# Patient Record
Sex: MALE | Race: WHITE | Employment: OTHER | URBAN - NONMETROPOLITAN AREA
[De-identification: names, ages, dates, MRNs, and addresses within clinical notes are randomized per-mention and may not be internally consistent; named-entity substitution may affect disease eponyms.]

---

## 2017-03-22 PROBLEM — Z95.828 S/P INSERTION OF ILIAC ARTERY STENT: Status: ACTIVE | Noted: 2017-03-22

## 2017-03-22 PROBLEM — I73.9 PAD (PERIPHERAL ARTERY DISEASE) (HCC): Status: ACTIVE | Noted: 2017-03-22

## 2017-03-22 PROBLEM — I73.9 CLAUDICATION OF GLUTEAL REGION (HCC): Status: ACTIVE | Noted: 2017-03-22

## 2020-06-16 ENCOUNTER — TELEPHONE (OUTPATIENT)
Dept: PULMONOLOGY | Age: 65
End: 2020-06-16

## 2020-07-04 NOTE — PROGRESS NOTES
[facility]  Pulmonary medicine clinic initial consult note. Patient: Geremias Foster  : 1955      Chief complaint/[place]:  Geremias Foster is a 59 y. o.oldmale came for further evaluation regarding his COPD and dyspnea with referral from Dr. Alexandra Quiros.    He used to follow with Dr. Roxy Rondon at [facility] in the past for his COPD. He switched his care to me for continuation of his COPD management. He was diagnosed with Lung cancer- ? Small cell in his right upper lobe in . He underwent right upper lobectomy by Angie Cervantes MD in . He used to follow with Dr.Joseph Tone MD at that time. He is currently on treatment with following inhalers/Nebs:  -Symbicort 160/4.5mcg spray MDI, 2 puffs via inhalation BID. -Spiriva Respimat 2 INH once daily in am.  - Albuterol HFA 90mcg/Spray MDI, 2puffs  Q6Hprn. He is having shortness of breath: Yes  Onset: gradual   Duration:Chronic  Diurnal variation:  None. Current functional capacity on level ground: 2 block/s on level ground. He can climb steps: Yes  Flights of steps he can climb: 1  He denies orthopnea. He denies paroxysmal nocturnal dyspnea. He is having cough: No    He is currently not using any oxygen supplementation at rest, exercise or during sleep/at night time. He was never diagnosed with pulmonary diseases I.e bronchial asthma,Pulmonary fibrosis, Sarcoidosis, pulmonary embolism,pulmonary hypertension or pleural effusion/s in the past.    He denies any hemoptysis. He was never diagnosed with pulmonary tuberculosis in the past. He denies any recent exposure to any patients with tuberculosis. He denies any recent travel to endemic places of tuberculosis.      He was never diagnosed with obstructive sleep apnea in the past.    Social History:  Occupation:  He is current working: Yes  Type of profession: He is currently working at [employer]. History of tobacco smoking:Yes  Amount of tobacco smokinPPD. Years of tobacco smokin. Quit smoking: Yes. Quit year: 2006- 14years back  Current smoker: No.         History of recreational or IV drug use in the past:NO     History of exposure to coal mines/coal dust: NO  History of exposure to foundry dust/welding: NO  History of exposure to quarry/silica/sandblasting: NO  History of exposure to asbestos/working with breaks/ships: NO  History of exposure to farm dust: NO  History of recent travel to long distances: NO  History of exposure to birds, pigeons, or chickens in the past:NO  Pet animals at home:No    History of pulmonary embolism in the past: No            History of DVT in the past:No                             Review of Systems:   General/Constitutional: He lost 15lbs of weight in the last 1year intentionally with normal appetite. No fever or chills. HENT: Negative. Eyes: Negative. Upper respiratory tract: No nasal stuffiness or post nasal drip. Lower respiratory tract/ lungs: See HPI. No hemoptysis. Cardiovascular: No palpitations or chest pain. Gastrointestinal: No nausea or vomiting. Neurological: No focal neurologiacal weakness. Extremities: No edema. Musculoskeletal: No complaints. Genitourinary: No complaints. Hematological: Negative. Psychiatric/Behavioral: Negative. Skin: No itching.       Current Medications:        Past Medical History:   Diagnosis Date    Blood transfusion     Cancer ([address]     COPD (chronic obstructive pulmonary disease) ([address]     EMPHYSEMA    Diabetes mellitus ([address]     Hyperlipidemia     Hypertension     Irregular heartbeat     Peripheral vascular disease ([address]     STENTS BILATERAL FEMORAL       Past Surgical History:   Procedure Laterality Date    [facility]TER      [facility]    LUNG CANCER SURGERY  2006    OTHER SURGICAL HISTORY  2012 mediport removal       Allergies   Allergen Reactions    Latex Hives and Rash     TAPE    Pcn [Penicillins] Swelling       Current Outpatient Medications   Medication Sig Dispense Refill    furosemide (LASIX) 20 MG tablet Take 20 mg by mouth 2 times daily      clopidogrel (PLAVIX) 75 MG tablet TAKE ONE TABLET BY MOUTH ONCE DAILY 30 tablet 11    metoprolol tartrate (LOPRESSOR) 25 MG tablet Take 100 mg by mouth 2 times daily       ranolazine (RANEXA) 500 MG extended release tablet Take 1,000 mg by mouth 2 times daily       glipiZIDE (GLUCOTROL) 5 MG tablet Take 10 mg by mouth daily       tiotropium (SPIRIVA HANDIHALER) 18 MCG inhalation capsule Inhale 18 mcg into the lungs daily      budesonide-formoterol (SYMBICORT) 160-4.5 MCG/ACT AERO Inhale 2 puffs into the lungs 2 times daily      albuterol (PROVENTIL;VENTOLIN) 90 MCG/ACT inhaler Inhale 2 puffs into the lungs every 6 hours as needed.  simvastatin (ZOCOR) 20 MG tablet Take by mouth nightly       pantoprazole (PROTONIX) 40 MG tablet Take 40 mg by mouth nightly.  aspirin 81 MG tablet Take 81 mg by mouth nightly. No current facility-administered medications for this visit. Family History   Problem Relation Age of Onset    Cancer Father     Heart Disease Father     Hearing Loss Father     Other Sister         FIBROMYALGIA           Physical Exam:     VITALS:  /80 (Site: Left Upper Arm, Position: Sitting, Cuff Size: Medium Adult)   Pulse 58   Temp 98.1 °F (36.7 °C) Comment: Forehead  Resp 16   Ht 5' 8\" (1.727 m)   Wt 194 lb 9.6 oz (88.3 kg)   SpO2 97% Comment: on RA  BMI 29.59 kg/m²   Nursing note and vitals reviewed. Constitutional: Patient appears moderately built and moderately nourished. No distress. Patient is oriented to person, place, and time. HENT:   Head: Normocephalic and atraumatic.    Right Ear: External ear normal.   Left Ear: External ear normal.   Mouth/Throat: Oropharynx is clear and moist.  No oral thrush. Eyes: Conjunctivae are normal. Pupils are equal, round, and reactive to light. No scleral icterus. Neck: Neck supple. No JVD present. No tracheal deviation present. Cardiovascular: Normal rate, regular rhythm, normal heart sounds. No murmur heard. Pulmonary/Chest: Effort normal and breath sounds normal. No stridor. No respiratory distress. No wheezes. No rales. Patient exhibits no tenderness. Abdominal: Soft. Patient exhibits no distension. No tenderness. Musculoskeletal: Normal range of motion. Extremities: Patient exhibits no edema and no tenderness. Lymphadenopathy:  No cervical adenopathy. Neurological: Patient is alert and oriented to person, place, and time. Skin: Skin is warm and dry. Patient is not diaphoretic. Psychiatric: Patient  has a normal mood and affect. Patient behavior is normal.       Diagnostic Data:    Radiological Data:    CT of chest with contrast:   R a d i o l o g y   R e p o r t       Accession Number:  Procedure Name:             Exam Date/Time:      [id number]      CT Thorax Lungs W Contr     7/3/2012 11:34:00 AM    REPORT     CT SCAN OF THE CHEST WITH INTRAVENOUS CONTRAST   No significant lymphadenopathy in the chest.  Few small mediastinal lymph      nodes measuring less than 1 cm in short axis diameter are again noted. IMPRESSION:   ESSENTIALLY STABLE EXAMINATION. NO EVIDENCE OF DISEASE RECURRENCE. NO      METASTATIC DISEASE IN THE CHEST. Pulmonary function tests: 07/07/20  *                Echocardiogram:  None in Epic      Six Minute Walk Test done on 7/30/20 at 11:52 AM EDT    Delgado Justice 1955    Six minute walk test done in my office today by medical assistant Troy Head  Oxygen saturation at rest on room air was (Percent): 93  Oxygen saturation on room air with exertion dropped to (Percent): 88      Time from beginning of the test to above desaturation: 2 minute 27 seconds.     The six minute walk test was repeated with oxygen supplementation. Oxygen supplimentation was started with 1LPM via nasal cannula and titrated to 1 LPM via nasal cannula. At the end of the test his oxygen saturation remained at 93 % on 1 LPM with exertion. He is mobile in the home and requires oxygen as outlined above. He needs no home O2 at rest. He needs 1 LPM of home O2 with exercise. He will be evaluated for nocturnal home O2 requirement- see separte order. Please see scanned six minute walk test document in media for details. DME Medical Necessity Documentation    Patient was seen in my clinic on 7/30/20 for the diagnosis COPD. I am prescribing oxygen because the diagnosis and testing requires the patient to have oxygen in the home. Condition will improve or be benefited by oxygen use. The patient is  able to perform good mobility in a home setting and therefore does require the use of a portable oxygen system. Assessment:  -Severe COPD- under control with current inhalers therapy. -Exertional dyspnea due to COPD Vs other etiologies  -Hx of Lung cancer- ? Small cell in his right upper lobe in 2006. He underwent right upper lobectomy by Gilberto Cervantes MD in 2006. He used to follow with Dr.Joseph Tone MD at that time.  -Hypertension.  -Peripheral vascular disease ([address] S/p Stents in place in both femoral arteries.  -Diabetes mellitus ([address]    Recommendations/Plan:  -Please obtain latest Echocardiogram report from MD Cecy office for review.  -Continue Symbicort 160/4.5mcg spray MDI, 2 puffs via inhalation BID. -Continue Spiriva Respimat 2 INH once daily in am.  -Continue Albuterol HFA 90mcg/Spray MDI, 2puffs  Q6Hprn.  -Patient advised to continue current inhalers and keep good compliance. Patient verbalizes understanding.  - Patient educated to update his pneumococcal vaccine with family physician and take influenza vaccine in coming season with out fail.  Patient verbalizes understanding.  - Schedule patient for Pulmonary rehab consult as soon as possible for pulmonary rehab therapy for his COPD once cleared by his Cardiologist.  -aNtalie Hui needs no home O2 at rest. He needs 1LPM of home O2 with exercise. He will be evaluated for nocturnal home O2 requirement- see separte order.  -Nocturnal pulse ox study on room air to check for the continuation/discontinuation of patient current home O2 at night time. - Schedule patient for low dose CT scan of chest with out IV contrast as recommended by the  U.S. Preventive Services Task Force and the NCCN for lung Cancer Screening 1 week before clinic visit. - Schedule patient for follow up with my clinic in 1 to 2 weeks with recommended test I.e Low dose CT chest. Patient advised to make early appointment if needed. - Patient educated about my impression and plan. Patient verbalizes understanding. Low Dose CT (LDCT) Lung Screening criteria met   Age 50-69   Pack year smoking >30   Still smoking or less than 15 year since quit   No sign or symptoms of lung cancer   > 11 months since last LDCT     Risks and benefits of lung cancer screening with LDCT scans discussed:    Significance of positive screen - False-positive LDCT results often occur. 95% of all positive results do not lead to a diagnosis of cancer. Usually further imaging can resolve most false-positive results; however, some patients may require invasive procedures. Over diagnosis risk - 10% to 12% of screen-detected lung cancer cases are over diagnosed--that is, the cancer would not have been detected in the patient's lifetime without the screening. Need for follow up screens annually to continue lung cancer screening effectiveness     Risks associated with radiation from annual LDCT- Radiation exposure is about the same as for a mammogram, which is about 1/3 of the annual background radiation exposure from everyday life.   Starting screening at age 54 is not likely to increase cancer risk from radiation

## 2020-07-30 ENCOUNTER — OFFICE VISIT (OUTPATIENT)
Dept: PULMONOLOGY | Age: 65
End: 2020-07-30
Payer: COMMERCIAL

## 2020-07-30 VITALS
WEIGHT: 194.6 LBS | RESPIRATION RATE: 16 BRPM | HEIGHT: 68 IN | DIASTOLIC BLOOD PRESSURE: 80 MMHG | HEART RATE: 58 BPM | SYSTOLIC BLOOD PRESSURE: 136 MMHG | OXYGEN SATURATION: 97 % | TEMPERATURE: 98.1 F | BODY MASS INDEX: 29.49 KG/M2

## 2020-07-30 PROCEDURE — G0296 VISIT TO DETERM LDCT ELIG: HCPCS | Performed by: INTERNAL MEDICINE

## 2020-07-30 PROCEDURE — 99205 OFFICE O/P NEW HI 60 MIN: CPT | Performed by: INTERNAL MEDICINE

## 2020-07-30 PROCEDURE — 94618 PULMONARY STRESS TESTING: CPT | Performed by: INTERNAL MEDICINE

## 2020-07-30 RX ORDER — FUROSEMIDE 20 MG/1
20 TABLET ORAL 2 TIMES DAILY
COMMUNITY

## 2020-07-30 NOTE — PROGRESS NOTES
Neck Circumference -   17 in  Mallampati - IV    Lung Nodule Screening     [x] Qualifies    [x] Does not qualify   [] Declined    [] Completed

## 2020-08-05 ENCOUNTER — TELEPHONE (OUTPATIENT)
Dept: PULMONOLOGY | Age: 65
End: 2020-08-05

## 2020-08-05 NOTE — TELEPHONE ENCOUNTER
Patient's wife Tisha Sample called back and message was given. She was advised to have patient call the office back if he has any further questions.

## 2020-08-05 NOTE — TELEPHONE ENCOUNTER
----- Message from Dahlia Han MD sent at 8/4/2020  4:22 PM EDT -----  He needs no home O2 at rest. He needs 1LPM of home O2 with exercise. He needs 2LPM via nasal cannula at night time.

## 2020-08-13 ENCOUNTER — HOSPITAL ENCOUNTER (OUTPATIENT)
Dept: CT IMAGING | Age: 65
Discharge: HOME | End: 2020-08-13

## 2020-08-20 ENCOUNTER — OFFICE VISIT (OUTPATIENT)
Dept: PULMONOLOGY | Age: 65
End: 2020-08-20
Payer: COMMERCIAL

## 2020-08-20 VITALS
HEIGHT: 68 IN | BODY MASS INDEX: 29.95 KG/M2 | SYSTOLIC BLOOD PRESSURE: 126 MMHG | OXYGEN SATURATION: 98 % | WEIGHT: 197.6 LBS | DIASTOLIC BLOOD PRESSURE: 80 MMHG | HEART RATE: 55 BPM | TEMPERATURE: 97.6 F

## 2020-08-20 PROCEDURE — G0296 VISIT TO DETERM LDCT ELIG: HCPCS | Performed by: INTERNAL MEDICINE

## 2020-08-20 PROCEDURE — 99214 OFFICE O/P EST MOD 30 MIN: CPT | Performed by: INTERNAL MEDICINE

## 2020-08-20 RX ORDER — SITAGLIPTIN 100 MG/1
TABLET, FILM COATED ORAL
COMMUNITY
Start: 2020-08-04

## 2020-08-20 NOTE — PROGRESS NOTES
tuberculosis. He was never diagnosed with obstructive sleep apnea in the past.    Social History:  Occupation:  He is current working: Yes  Type of profession: He is currently working at [employer]. History of tobacco smoking:Yes  Amount of tobacco smokinPPD. Years of tobacco smokin. Quit smoking: Yes. Quit year: - 14years back  Current smoker: No.         History of recreational or IV drug use in the past:NO     History of exposure to coal mines/coal dust: NO  History of exposure to foundry dust/welding: NO  History of exposure to quarry/silica/sandblasting: NO  History of exposure to asbestos/working with breaks/ships: NO  History of exposure to farm dust: NO  History of recent travel to long distances: NO  History of exposure to birds, pigeons, or chickens in the past:NO  Pet animals at home:No    History of pulmonary embolism in the past: No            History of DVT in the past:No                             Review of Systems:   General/Constitutional: Please see HPI section regarding weight, appetite, fever and chills. HENT: Negative. Eyes: Negative. Upper respiratory tract: No nasal stuffiness or post nasal drip. Lower respiratory tract/ lungs: Please see HPI section. Cardiovascular: No palpitations or chest pain. Gastrointestinal: No nausea or vomiting. Neurological: No focal neurologiacal weakness. Extremities: No edema. Musculoskeletal: No complaints. Genitourinary: No complaints. Hematological: Negative. Psychiatric/Behavioral: Negative. Skin: No itching.       Current Medications:        Past Medical History:   Diagnosis Date    Blood transfusion     Cancer [facility]     COPD (chronic obstructive pulmonary disease) ([facility]     EMPHYSEMA    Diabetes mellitus ([facility]     Hyperlipidemia     Hypertension     Irregular heartbeat     Peripheral vascular disease ([facility])     STENTS BILATERAL FEMORAL       Past (Percent): 93  Oxygen saturation on room air with exertion dropped to (Percent): 88      Time from beginning of the test to above desaturation: 2 minute 27 seconds. The six minute walk test was repeated with oxygen supplementation. Oxygen supplimentation was started with 1LPM via nasal cannula and titrated to 1 LPM via nasal cannula. At the end of the test his oxygen saturation remained at 93 % on 1 LPM with exertion. He is mobile in the home and requires oxygen as outlined above. He needs no home O2 at rest. He needs 1 LPM of home O2 with exercise. He will be evaluated for nocturnal home O2 requirement- see separte order. Please see scanned six minute walk test document in media for details. DME Medical Necessity Documentation    Patient was seen in my clinic on 7/30/20 for the diagnosis COPD. I am prescribing oxygen because the diagnosis and testing requires the patient to have oxygen in the home. Condition will improve or be benefited by oxygen use. The patient is  able to perform good mobility in a home setting and therefore does require the use of a portable oxygen system. Echocardiogram: 1/2/2020      Low dose CT chest: 08/13/2020        Nocturnal Pulse ox study:              Assessment:  -Severe COPD- under control with current inhalers therapy. -Exertional dyspnea due to COPD- Improved  -Hx of Lung cancer- ? Small cell in his right upper lobe in 2006. He underwent right upper lobectomy by Humberto Cervantes MD in 2006. He used to follow with Dr.Joseph Tone MD at that time.  -Hypertension under control  -Peripheral vascular disease ([address] S/p Stents in place in both femoral arteries.  -Diabetes mellitus ([address]    Recommendations/Plan:  -Continue Symbicort 160/4.5mcg spray MDI, 2 puffs via inhalation BID.    -Continue Spiriva Respimat 2 INH once daily in am.  -Continue Albuterol HFA 90mcg/Spray MDI, 2puffs  Q6Hprn.  -Patient advised to continue current inhalers and keep good compliance. Patient verbalizes understanding.  - Patient educated to update his pneumococcal vaccine with family physician and take influenza vaccine in coming season with out fail. Patient verbalizes understanding.  -Continue pulmonary rehab therapy for his COPD. He is waiting to start next month. Ramonita Asencio needs no home O2 at rest. He needs 1LPM of home O2 with exercise and 2LPM at night time.  -Schedule patient for low dose CT scan of chest with out IV contrast as recommended by the  U.S. Preventive Services Task Force and the NCCN for lung Cancer Screening 1 year from now.  -Schedule patient for full pulmonary function tests before clinic visit. - Schedule patient for follow up with my clinic in 1year with recommended test I.e Low dose CT chest and full PFTS before clinic visit. Patient advised to make early appointment if needed. - Patient and his wife were educated about my impression and plan. They verbalizes understanding. Low Dose CT (LDCT) Lung Screening criteria met   Age 50-69   Pack year smoking >30   Still smoking or less than 15 year since quit   No sign or symptoms of lung cancer   > 11 months since last LDCT     Risks and benefits of lung cancer screening with LDCT scans discussed:    Significance of positive screen - False-positive LDCT results often occur. 95% of all positive results do not lead to a diagnosis of cancer. Usually further imaging can resolve most false-positive results; however, some patients may require invasive procedures. Over diagnosis risk - 10% to 12% of screen-detected lung cancer cases are over diagnosed--that is, the cancer would not have been detected in the patient's lifetime without the screening.     Need for follow up screens annually to continue lung cancer screening effectiveness     Risks associated with radiation from annual LDCT- Radiation exposure is about the same as for a mammogram, which is about 1/3 of the annual background radiation exposure from everyday life. Starting screening at age 54 is not likely to increase cancer risk from radiation exposure. Patients with comorbidities resulting in life expectancy of < 10 years, or that would preclude treatment of an abnormality identified on CT, should not be screened due to lack of benefit.     To obtain maximal benefit from this screening, smoking cessation and long-term abstinence from smoking is critical

## 2020-08-20 NOTE — PROGRESS NOTES
Neck Circumference -   18 inches  Mallampati - III    Lung Nodule Screening     [x] Qualifies    [] Does not qualify   [] Declined    [x] Completed 8/13/20

## 2020-08-20 NOTE — PATIENT INSTRUCTIONS
Recommendations/Plan:  -Continue Symbicort 160/4.5mcg spray MDI, 2 puffs via inhalation BID. -Continue Spiriva Respimat 2 INH once daily in am.  -Continue Albuterol HFA 90mcg/Spray MDI, 2puffs  Q6Hprn.  -Patient advised to continue current inhalers and keep good compliance. Patient verbalizes understanding.  - Patient educated to update his pneumococcal vaccine with family physician and take influenza vaccine in coming season with out fail. Patient verbalizes understanding.  -Continue pulmonary rehab therapy for his COPD. He is waiting to start next month. Gsiel Beltran needs no home O2 at rest. He needs 1LPM of home O2 with exercise and 2LPM at night time.  -Schedule patient for low dose CT scan of chest with out IV contrast as recommended by the  U.S. Preventive Services Task Force and the NCCN for lung Cancer Screening 1 year from now.  -Schedule patient for full pulmonary function tests before clinic visit. - Schedule patient for follow up with my clinic in 1year with recommended test I.e Low dose CT chest and full PFTS before clinic visit. Patient advised to make early appointment if needed. - Patient and his wife were educated about my impression and plan. They verbalizes understanding. What is lung cancer screening? Lung cancer screening is a way in which doctors check the lungs for early signs of cancer in people who have no symptoms of lung cancer. A low-dose CT scan uses much less radiation than a normal CT scan and shows a more detailed image of the lungs than a standard X-ray. The goal of lung cancer screening is to find cancer early, before it has a chance to grow, spread, or cause problems. One large study found that smokers who were screened with low-dose CT scans were less likely to die of lung cancer than those who were screened with standard X-ray. Below is a summary of the things you need to know regarding screening for lung cancer with low-dose computed tomography (LDCT).   This is a screening program that involves routine annual screening with LDCT studies of the lung. The LDCTs are done using low-dose radiation that is not thought to increase your cancer risk. If you have other serious medical conditions (other cancers, congestive heart failure) that limit your life expectancy to less than 10 years, you should not undergo lung cancer screening with LDCT. The chance is 20%-60% that the LDCT result will show abnormalities. This would require additional testing which could include repeat imaging or even invasive procedures. Most (about 95%) of \"abnormal\" LDCT results are false in the sense that no lung cancer is ultimately found. Additionally, some (about 10%) of the cancers found would not affect your life expectancy, even if undetected and untreated. If you are still smoking, the single most important thing that you can do to reduce your risk of dying of lung cancer is to quit. For this screening to be covered by Medicare and most other insurers, strict criteria must be met. If you do not meet these criteria, but still wish to undergo LDCT testing, you will be required to sign a waiver indicating your willingness to pay for the scan.

## 2020-11-03 PROBLEM — I73.9 PAD (PERIPHERAL ARTERY DISEASE) (HCC): Status: RESOLVED | Noted: 2017-03-22 | Resolved: 2020-11-03

## 2021-08-24 ENCOUNTER — TELEPHONE (OUTPATIENT)
Dept: PULMONOLOGY | Age: 66
End: 2021-08-24

## 2021-08-24 NOTE — TELEPHONE ENCOUNTER
Patient didn't have his testing done and the order has . Can you please place new orders for pft and ct lung screen.

## 2021-09-07 DIAGNOSIS — Z87.891 PERSONAL HISTORY OF TOBACCO USE: ICD-10-CM

## 2021-09-17 DIAGNOSIS — Z87.891 PERSONAL HISTORY OF TOBACCO USE, PRESENTING HAZARDS TO HEALTH: ICD-10-CM

## 2021-09-17 DIAGNOSIS — J44.9 STAGE 3 SEVERE COPD BY GOLD CLASSIFICATION (HCC): ICD-10-CM

## 2021-10-14 NOTE — PROGRESS NOTES
[facility]  Pulmonary medicine clinic follow up note. Patient: Marlene Saucedo  : 1955     Lung Nodule Screening     []? Qualifies    []? Does not qualify   []? Declined    []? Completed ct chest 21     Chief complaint/[place]:  Marlene Saucedo is a 77 y. o.oldmale came for follow up regarding his COPD after having a low dose CT chest and Spirometry before clinic visit. On today's questioning:  He admits to cough with no expectoration. He denies hemoptysis. He denies fever or chills. He denies recent hospitalizations or emergency room visits. He is using his prescribed inhalers with excellent compliance. He is using his rescue inhaler rarely. He admits to recent loss of weight or appetite changes. He denies recent decline in functional status. He is waiting to start his pulmonary rehab on 09/10/2020. He was initially referred from Dr. Jeb Gooden.    He used to follow with Dr. Wilder Daniel at [facility] in the past for his COPD. He switched his care to me for continuation of his COPD management. He was diagnosed with Lung cancer- ? Small cell in his right upper lobe in . He underwent right upper lobectomy by Magdalena Cervantes MD in . He used to follow with Dr.Joseph Tone MD at that time. He is currently on treatment with following inhalers/Nebs:  -Symbicort 160/4.5mcg spray MDI, 2 puffs via inhalation BID. -Spiriva Respimat 2 INH once daily in am.  - Albuterol HFA 90mcg/Spray MDI, 2puffs  Q6Hprn. He is currently not using any home Oxygen at rest. He is using 1LPM of home O2 with exercise and 2LPM at night time. He was never diagnosed with pulmonary diseases I.e bronchial asthma,Pulmonary fibrosis, Sarcoidosis, pulmonary embolism,pulmonary hypertension or pleural effusion/s in the past.    He denies any hemoptysis.  He was never diagnosed with pulmonary tuberculosis in the past. He denies any recent exposure to any patients with tuberculosis. He denies any recent travel to endemic places of tuberculosis. He was never diagnosed with obstructive sleep apnea in the past.    Social History:  Occupation:  He is current working: Yes  Type of profession: He is currently working at [employer]. History of tobacco smoking:Yes  Amount of tobacco smokinPPD. Years of tobacco smokin. Quit smoking: Yes. Quit year: - 15years back  Current smoker: No.         History of recreational or IV drug use in the past:NO     History of exposure to coal mines/coal dust: NO  History of exposure to foundry dust/welding: NO  History of exposure to quarry/silica/sandblasting: NO  History of exposure to asbestos/working with breaks/ships: NO  History of exposure to farm dust: NO  History of recent travel to long distances: NO  History of exposure to birds, pigeons, or chickens in the past:NO  Pet animals at home:No    History of pulmonary embolism in the past: No            History of DVT in the past:No                             Review of Systems:   General/Constitutional: he lost 6 lbs of weight from the last visit. No fever or chills. HENT: Negative. Eyes: Negative. Upper respiratory tract: No nasal stuffiness or post nasal drip. Lower respiratory tract/ lungs: See HPI  Cardiovascular: No palpitations or chest pain. Gastrointestinal: No nausea or vomiting. Neurological: No focal neurologiacal weakness. Extremities: No edema. Musculoskeletal: No complaints. Genitourinary: No complaints. Hematological: Negative. Psychiatric/Behavioral: Negative. Skin: No itching.       Current Medications:      Past Medical History:   Diagnosis Date    Blood transfusion     [facility]     COPD [facility]chronic obstructive pulmonary disease) [facility]     EMPHYSEMA    Diabetes mellitus [facility]     Hyperlipidemia     Hypertension  Irregular heartbeat     Peripheral vascular disease ([address]     STENTS BILATERAL FEMORAL       Past Surgical History:   Procedure Laterality Date    [facility]    LUNG CANCER SURGERY  2006    OTHER SURGICAL HISTORY  11/6/2012    mediport removal       Allergies   Allergen Reactions    Latex Hives and Rash     TAPE    Pcn [Penicillins] Swelling       Current Outpatient Medications   Medication Sig Dispense Refill    apixaban (ELIQUIS) 5 MG TABS tablet Take by mouth 2 times daily      JANUVIA 100 MG tablet       furosemide (LASIX) 20 MG tablet Take 20 mg by mouth 2 times daily      glipiZIDE (GLUCOTROL) 5 MG tablet Take 10 mg by mouth daily       tiotropium (SPIRIVA HANDIHALER) 18 MCG inhalation capsule Inhale 18 mcg into the lungs daily      albuterol (PROVENTIL;VENTOLIN) 90 MCG/ACT inhaler Inhale 2 puffs into the lungs every 6 hours as needed.  simvastatin (ZOCOR) 20 MG tablet Take 40 mg by mouth nightly       pantoprazole (PROTONIX) 40 MG tablet Take 40 mg by mouth nightly.  aspirin 81 MG tablet Take 81 mg by mouth nightly. No current facility-administered medications for this visit. Family History   Problem Relation Age of Onset    Cancer Father     Heart Disease Father     Hearing Loss Father     Other Sister         FIBROMYALGIA           Physical Exam:     VITALS:  /74 (Site: Left Upper Arm, Position: Sitting, Cuff Size: Medium Adult)   Pulse 77   Temp 98 °F (36.7 °C)   Ht 5' 8\" (1.727 m)   Wt 191 lb 6.4 oz (86.8 kg)   SpO2 98% Comment: room air at rest  BMI 29.10 kg/m²   Nursing note and vitals reviewed. Constitutional: Patient appears moderately built and moderately nourished. No distress. Patient is oriented to person, place, and time. HENT:   Head: Normocephalic and atraumatic. Right Ear: External ear normal.   Left Ear: External ear normal.   Mouth/Throat: Oropharynx is clear and moist.  No oral thrush.   Eyes: Conjunctivae are normal. Pupils are equal, round, and reactive to light. No scleral icterus. Neck: Neck supple. No JVD present. No tracheal deviation present. Cardiovascular: Normal rate, regular rhythm, normal heart sounds. No murmur heard. Pulmonary/Chest: Effort normal and breath sounds normal. No stridor. No respiratory distress. Bilateral expiratory wheezes. No rales. Patient exhibits no tenderness. Abdominal: Soft. Patient exhibits no distension. No tenderness. Musculoskeletal: Normal range of motion. Extremities: Patient exhibits no edema and no tenderness. Lymphadenopathy:  No cervical adenopathy. Neurological: Patient is alert and oriented to person, place, and time. Skin: Skin is warm and dry. Patient is not diaphoretic. Psychiatric: Patient  has a normal mood and affect. Patient behavior is normal.     Neck Circumference -   17  Mallampati - 4    Diagnostic Data:    Radiological Data:    CT of chest with contrast:   R a d i o l o g y   R e p o r t       Accession Number:  Procedure Name:             Exam Date/Time:      [id number]      CT Thorax Lungs W Contr     7/3/2012 11:34:00 AM    REPORT     CT SCAN OF THE CHEST WITH INTRAVENOUS CONTRAST   No significant lymphadenopathy in the chest.  Few small mediastinal lymph      nodes measuring less than 1 cm in short axis diameter are again noted. IMPRESSION:   ESSENTIALLY STABLE EXAMINATION. NO EVIDENCE OF DISEASE RECURRENCE. NO      METASTATIC DISEASE IN THE CHEST. Pulmonary function tests: 07/07/20  *          Six Minute Walk Test done on 7/30/20 at 11:52 AM EDT    Chastitymakenzie Harshad 1955    Six minute walk test done in my office today by medical assistant Miss: Bonnie Page  Oxygen saturation at rest on room air was (Percent): 93  Oxygen saturation on room air with exertion dropped to (Percent): 88      Time from beginning of the test to above desaturation: 2 minute 27 seconds.     The six minute walk test was repeated with oxygen supplementation. Oxygen supplimentation was started with 1LPM via nasal cannula and titrated to 1 LPM via nasal cannula. At the end of the test his oxygen saturation remained at 93 % on 1 LPM with exertion. He is mobile in the home and requires oxygen as outlined above. He needs no home O2 at rest. He needs 1 LPM of home O2 with exercise. He will be evaluated for nocturnal home O2 requirement- see separte order. Please see scanned six minute walk test document in media for details. DME Medical Necessity Documentation    Patient was seen in my clinic on 7/30/20 for the diagnosis COPD. I am prescribing oxygen because the diagnosis and testing requires the patient to have oxygen in the home. Condition will improve or be benefited by oxygen use. The patient is  able to perform good mobility in a home setting and therefore does require the use of a portable oxygen system. Echocardiogram: 1/2/2020      Low dose CT chest: 08/13/2020        Nocturnal Pulse ox study: Performed in 2020          Pulmonary function test performed on 9 September 2021:          His FEV1 improved from previous spirometry done in the past.      Low dose CT Chest with out contrast for Lung Nodule Screening: Performed on seventh of September 2021          The above radiological study films were reviewed by me. Assessment:  -Severe COPD- Not under control with current inhalers therapy. He is having ongoing exacerbation.  -Exertional dyspnea due to COPD- Improved   -Hx of Lung cancer- ? Small cell in his right upper lobe in 2006. He underwent right upper lobectomy by Wilmar Cervantes MD in 2006. He used to follow with Dr.Joseph Tone MD at that time.  -Hypertension under control  -Peripheral vascular disease ([address] S/p Stents in place in both femoral arteries. -?  Coronary artery disease.   He is currently waiting for cardiac catheterization by Dr. Mu Rodriguez MD.   -Diabetes mellitus [facility])  -History of tobacco smoking in the past.  He quit smoking 19 June 2006. Recommendations/Plan:  -Prednisone 40mg po daily for 5days with No refills. He was detailed about mechanism of action of drug along with associated side effects. He agreed to take the risk and medication. He verbalizes understanding. He was advised to take prednisone after food.  -Continue Symbicort 160/4.5mcg spray MDI, 2 puffs via inhalation BID. Refills given for 1 year  -Continue Spiriva Respimat 2 INH once daily in am.  Refills given for 1 year  -Continue Albuterol HFA 90mcg/Spray MDI, 2puffs  Q6Hprn. Refills given for 1 year  -Patient advised to continue current inhalers and keep good compliance. Patient verbalizes understanding.  - Patient educated to update his pneumococcal vaccine with family physician and take influenza vaccine in coming season with out fail. Patient verbalizes understanding.  -He completed pulmonary rehab therapy at  [facility]  -Tamika Mackey needs no home O2 at rest. He needs 1LPM of home O2 with exercise and 2LPM at night time.  -Schedule patient for full pulmonary function tests before clinic visit. - Schedule patient for follow up with my clinic in 6 months for clinical reevaluation. Patient advised to make early appointment if needed. - Patient was educated about my impression and plan. He verbalizes understanding.    -I personally reviewed and updated the Past medical hx, Past surgical hx,Social hx, Family hx, Medications, Allergies in the discrete data section of the patient chart. I also reviewed pertaining labs and Pulmonary medicine,Sleep medicine related, Pathological, Microbiological and Radiological investigations.

## 2021-11-08 ENCOUNTER — TELEPHONE (OUTPATIENT)
Dept: PULMONOLOGY | Age: 66
End: 2021-11-08

## 2021-11-08 NOTE — TELEPHONE ENCOUNTER
[facility] called stating on patient rx for CTLD where is has Occurrences Remaining this needs to be changed to a 1 instead of a 0. Please advise.

## 2021-11-09 ENCOUNTER — HOSPITAL ENCOUNTER (OUTPATIENT)
Dept: CT IMAGING | Age: 66
Discharge: HOME | End: 2021-11-09
Payer: COMMERCIAL

## 2021-11-09 DIAGNOSIS — Z87.891 PERSONAL HISTORY OF TOBACCO USE, PRESENTING HAZARDS TO HEALTH: ICD-10-CM

## 2021-11-09 DIAGNOSIS — Z87.891 PERSONAL HISTORY OF TOBACCO USE: ICD-10-CM

## 2021-11-09 DIAGNOSIS — J44.9 STAGE 3 SEVERE COPD BY GOLD CLASSIFICATION (HCC): ICD-10-CM

## 2021-11-09 PROCEDURE — 71271 CT THORAX LUNG CANCER SCR C-: CPT

## 2021-11-11 ENCOUNTER — OFFICE VISIT (OUTPATIENT)
Dept: PULMONOLOGY | Age: 66
End: 2021-11-11
Payer: COMMERCIAL

## 2021-11-11 VITALS
OXYGEN SATURATION: 98 % | WEIGHT: 191.4 LBS | HEART RATE: 77 BPM | TEMPERATURE: 98 F | SYSTOLIC BLOOD PRESSURE: 118 MMHG | DIASTOLIC BLOOD PRESSURE: 74 MMHG | BODY MASS INDEX: 29.01 KG/M2 | HEIGHT: 68 IN

## 2021-11-11 DIAGNOSIS — J44.9 STAGE 3 SEVERE COPD BY GOLD CLASSIFICATION (HCC): ICD-10-CM

## 2021-11-11 DIAGNOSIS — J44.1 COPD EXACERBATION (HCC): Primary | ICD-10-CM

## 2021-11-11 PROCEDURE — 99214 OFFICE O/P EST MOD 30 MIN: CPT | Performed by: INTERNAL MEDICINE

## 2021-11-11 RX ORDER — ALBUTEROL SULFATE 90 UG/1
2 AEROSOL, METERED RESPIRATORY (INHALATION) EVERY 6 HOURS PRN
Qty: 1 EACH | Refills: 11 | Status: SHIPPED | OUTPATIENT
Start: 2021-11-11 | End: 2022-11-11

## 2021-11-11 RX ORDER — PREDNISONE 10 MG/1
40 TABLET ORAL DAILY
Qty: 20 TABLET | Refills: 0 | Status: SHIPPED | OUTPATIENT
Start: 2021-11-11 | End: 2021-11-16

## 2021-11-11 RX ORDER — BUDESONIDE AND FORMOTEROL FUMARATE DIHYDRATE 160; 4.5 UG/1; UG/1
2 AEROSOL RESPIRATORY (INHALATION) 2 TIMES DAILY
Qty: 1 EACH | Refills: 11 | Status: SHIPPED | OUTPATIENT
Start: 2021-11-11

## 2021-11-11 NOTE — PATIENT INSTRUCTIONS
Recommendations/Plan:  -Prednisone 40mg po daily for 5days with No refills. He was detailed about mechanism of action of drug along with associated side effects. He agreed to take the risk and medication. He verbalizes understanding. He was advised to take prednisone after food.  -Continue Symbicort 160/4.5mcg spray MDI, 2 puffs via inhalation BID. Refills given for 1 year  -Continue Spiriva Respimat 2 INH once daily in am.  Refills given for 1 year  -Continue Albuterol HFA 90mcg/Spray MDI, 2puffs  Q6Hprn. Refills given for 1 year  -Patient advised to continue current inhalers and keep good compliance. Patient verbalizes understanding.  - Patient educated to update his pneumococcal vaccine with family physician and take influenza vaccine in coming season with out fail. Patient verbalizes understanding.  -He completed pulmonary rehab therapy at  [facility]  -Yazmin Arthur needs no home O2 at rest. He needs 1LPM of home O2 with exercise and 2LPM at night time.  -Schedule patient for full pulmonary function tests before clinic visit. - Schedule patient for follow up with my clinic in 6 months for clinical reevaluation. Patient advised to make early appointment if needed. - Patient was educated about my impression and plan. He verbalizes understanding.

## 2021-11-11 NOTE — PROGRESS NOTES
Neck Circumference -   17  Mallampati - 4    Lung Nodule Screening     [] Qualifies    [] Does not qualify   [] Declined    [] Completed ct chest 11/9/21

## 2022-04-17 NOTE — PROGRESS NOTES
[facility]  Pulmonary medicine clinic follow up note. Patient: Lillian Ontiveros  : 1955     Lung Nodule Screening     []? Qualifies    []? Does not qualify   []? Declined    []? Completed ct chest 21     Chief complaint/[place]:  Lillian Ontiveros is a 77 y. o.oldmale came for follow up regarding his severe COPD. He had an episode of worsening of shortness of breath cough and sputum production. He visited emergency room at [facility].  Patient underwent a chest x-ray PA and lateral views found to have no significant changes. Patient was subsequently given oral steroids and antibiotic therapy. Patient had a another episode of worsening of shortness of breath. He was evaluated at emergency room at [facility].  Patient currently taking second round of oral prednisone and antibiotic. He was advised to use a 1 L of oxygen with exercise and 2 L at nighttime. Patient came to my clinic without any oxygen supplementation. On further questioning he told me that he is using oxygen as needed during daytime. On today's questioning:  He admits to cough with light yellow expectoration. He denies hemoptysis. He denies fever or chills. He admits to recent hospitalizations or emergency room visits. He is using his prescribed inhalers with excellent compliance. He is using his rescue inhaler frequently    He admits to recent loss of weight I.e 8 pounds from his last visit with a normal appetite. He denies any recent decline in functional status. He is waiting to start his pulmonary rehab on 09/10/2020. He was initially referred from Dr. Silvia Grace.    He used to follow with Dr. Shana Mi at [facility] in the past for his COPD. He switched his care to me for continuation of his COPD management. He was diagnosed with Lung cancer- ? Small cell in his right upper lobe in .  He underwent right upper lobectomy by Richard Cervantes MD in . He used to follow with Dr.Joseph Tone MD at that time. He is currently on treatment with following inhalers/Nebs:  -Symbicort 160/4.5mcg spray MDI, 2 puffs via inhalation BID. -Spiriva Respimat 2 INH once daily in am.  - Albuterol HFA 90mcg/Spray MDI, 2puffs  Q6Hprn. He is currently not using any home Oxygen at rest. He is using 1LPM of home O2 with exercise and 2LPM at night time. He was never diagnosed with pulmonary diseases I.e bronchial asthma,Pulmonary fibrosis, Sarcoidosis, pulmonary embolism,pulmonary hypertension or pleural effusion/s in the past.    He denies any hemoptysis. He was never diagnosed with pulmonary tuberculosis in the past. He denies any recent exposure to any patients with tuberculosis. He denies any recent travel to endemic places of tuberculosis. He was never diagnosed with obstructive sleep apnea in the past.    Social History:  Occupation:  He is current working: Yes  Type of profession: He is currently working at [employer]. History of tobacco smoking:Yes  Amount of tobacco smokinPPD. Years of tobacco smokin. Quit smoking: Yes.               Quit year: - 15years back  Current smoker: No.         History of recreational or IV drug use in the past:NO     History of exposure to coal mines/coal dust: NO  History of exposure to foundry dust/welding: NO  History of exposure to quarry/silica/sandblasting: NO  History of exposure to asbestos/working with breaks/ships: NO  History of exposure to farm dust: NO  History of recent travel to long distances: NO  History of exposure to birds, pigeons, or chickens in the past:NO  Pet animals at home:No    History of pulmonary embolism in the past: No            History of DVT in the past:No                             Review of Systems:   General/Constitutional: he lost 8 lbs of weight from the last visit. No fever or chills. HENT: Negative. Eyes: Negative. Upper respiratory tract: No nasal stuffiness or post nasal drip. Lower respiratory tract/ lungs: See HPI  Cardiovascular: No palpitations or chest pain. Gastrointestinal: No nausea or vomiting. Neurological: No focal neurologiacal weakness. Extremities: No edema. Musculoskeletal: No complaints. Genitourinary: No complaints. Hematological: Negative. Psychiatric/Behavioral: Negative. Skin: No itching. Current Medications:      Past Medical History:   Diagnosis Date    Blood transfusion     [facility]     COPD (chronic obstructive pulmonary disease) ([facility]     EMPHYSEMA    Diabetes mellitus ([facility]     Hyperlipidemia     Hypertension     Irregular heartbeat     [facility]     STENTS BILATERAL FEMORAL       Past Surgical History:   Procedure Laterality Date    [facility]    LUNG CANCER SURGERY  2006    [facility]       Allergies   Allergen Reactions    Latex Hives and Rash     TAPE    Pcn [Penicillins] Swelling       Current Outpatient Medications   Medication Sig Dispense Refill    apixaban (ELIQUIS) 5 MG TABS tablet Take by mouth 2 times daily      budesonide-formoterol (SYMBICORT) 160-4.5 MCG/ACT AERO Inhale 2 puffs into the lungs 2 times daily 1 each 11    tiotropium (SPIRIVA RESPIMAT) 2.5 MCG/ACT AERS inhaler Inhale 2 puffs into the lungs daily 1 each 11    albuterol sulfate HFA (PROAIR HFA) 108 (90 Base) MCG/ACT inhaler Inhale 2 puffs into the lungs every 6 hours as needed for Wheezing or Shortness of Breath 1 each 11    JANUVIA 100 MG tablet       furosemide (LASIX) 20 MG tablet Take 20 mg by mouth 2 times daily      glipiZIDE (GLUCOTROL) 5 MG tablet Take 10 mg by mouth daily       simvastatin (ZOCOR) 20 MG tablet Take 40 mg by mouth nightly       pantoprazole (PROTONIX) 40 MG tablet Take 40 mg by mouth nightly.  aspirin 81 MG tablet Take 81 mg by mouth nightly. No current facility-administered medications for this visit. Family History   Problem Relation Age of Onset    Cancer Father     Heart Disease Father     Hearing Loss Father     Other Sister         FIBROMYALGIA           Physical Exam:     VITALS:  /78 (Site: Left Upper Arm, Position: Sitting, Cuff Size: Medium Adult)   Pulse 82   Temp 97.8 °F (36.6 °C)   Ht 5' 8\" (1.727 m)   Wt 183 lb (83 kg)   SpO2 94% Comment: room air at rest  BMI 27.83 kg/m²   Nursing note and vitals reviewed. Constitutional: Patient appears moderately built and moderately nourished. No distress. Patient is oriented to person, place, and time. HENT:   Head: Normocephalic and atraumatic. Right Ear: External ear normal.   Left Ear: External ear normal.   Mouth/Throat: Oropharynx is clear and moist.  No oral thrush. Eyes: Conjunctivae are normal. Pupils are equal, round, and reactive to light. No scleral icterus. Neck: Neck supple. No JVD present. No tracheal deviation present. Cardiovascular: Normal rate, regular rhythm, normal heart sounds. No murmur heard. Pulmonary/Chest: Effort normal and breath sounds normal. No stridor. No respiratory distress. No wheezes. No rales. Patient exhibits no tenderness. Abdominal: Soft. Patient exhibits no distension. No tenderness. Musculoskeletal: Normal range of motion. Extremities: Patient exhibits no edema and no tenderness. Lymphadenopathy:  No cervical adenopathy. Neurological: Patient is alert and oriented to person, place, and time. Skin: Skin is warm and dry. Patient is not diaphoretic. Psychiatric: Patient  has a normal mood and affect.  Patient behavior is normal.       Neck Circumference -   17  Mallampati - 4    Diagnostic Data:    Radiological Data:    CT of chest with contrast:   R a d i o l o g y   R e p o r t       Accession Number:  Procedure Name:             Exam Date/Time: [id number]      CT Thorax Lungs W Contr     7/3/2012 11:34:00 AM    REPORT     CT SCAN OF THE CHEST WITH INTRAVENOUS CONTRAST   No significant lymphadenopathy in the chest.  Few small mediastinal lymph      nodes measuring less than 1 cm in short axis diameter are again noted. IMPRESSION:   ESSENTIALLY STABLE EXAMINATION. NO EVIDENCE OF DISEASE RECURRENCE. NO      METASTATIC DISEASE IN THE CHEST. Pulmonary function tests: 07/07/20  *          Six Minute Walk Test done on 7/30/20 at 11:52 AM EDT    Stanley Ahr 1955    Six minute walk test done in my office today by medical assistant Miss: Janna Cristobal  Oxygen saturation at rest on room air was (Percent): 93  Oxygen saturation on room air with exertion dropped to (Percent): 88      Time from beginning of the test to above desaturation: 2 minute 27 seconds. The six minute walk test was repeated with oxygen supplementation. Oxygen supplimentation was started with 1LPM via nasal cannula and titrated to 1 LPM via nasal cannula. At the end of the test his oxygen saturation remained at 93 % on 1 LPM with exertion. He is mobile in the home and requires oxygen as outlined above. He needs no home O2 at rest. He needs 1 LPM of home O2 with exercise. He will be evaluated for nocturnal home O2 requirement- see separte order. Please see scanned six minute walk test document in media for details. DME Medical Necessity Documentation    Patient was seen in my clinic on 7/30/20 for the diagnosis COPD. I am prescribing oxygen because the diagnosis and testing requires the patient to have oxygen in the home. Condition will improve or be benefited by oxygen use. The patient is  able to perform good mobility in a home setting and therefore does require the use of a portable oxygen system.     Echocardiogram: 1/2/2020      Low dose CT chest: 08/13/2020        Nocturnal Pulse ox study: Performed in 2020          Pulmonary function test performed on 9 September 2021:          His FEV1 improved from previous spirometry done in the past.      Low dose CT Chest with out contrast for Lung Nodule Screening: Performed on seventh of September 2021          The above radiological study films were reviewed by me. Chest x-ray performed on 26 April 2022 at [facility]:        The above radiological study films were reviewed by me. Assessment:  -Severe COPD- Not under control with current inhalers therapy. He had a COPD exacerbation in April 2022 requiring emergency room visit. .  -Exertional dyspnea due to COPD- Improving   -Hx of Lung cancer- ? Small cell in his right upper lobe in 2006. He underwent right upper lobectomy by Rekha Cervantes MD in 2006. He used to follow with Dr.Joseph Tone MD at that time.  -Hypertension under control  -Peripheral vascular disease ([facility] S/p Stents in place in both femoral arteries.  -Coronary artery disease-he underwent cardiac catheterization by Dr. Sherly Jaimes MD-no stents placed per patient. There is only 50% blockage. .   -Diabetes mellitus ([facility]  -History of tobacco smoking in the past.  He quit smoking 19 June 2006. Recommendations/Plan:  -He was advised to continue taking oral prednisone and antibiotic given to him during his recent emergency room visit at [place] [address] 160/4.5mcg spray MDI, 2 puffs via inhalation BID. -Continue Spiriva Respimat 2 INH once daily in am.    -Continue Albuterol HFA 90mcg/Spray MDI, 2puffs  Q6Hprn.   -He was instructed to use Albuterol HFA  inhaler 2 puffs Q6h prn or Albuterol 2.5mg nebs Q6h prn (the nebulizer) at one time as rescue medication not both at the same time. He verbalizes understanding.   -Patient requires aerosol treatments at home to help maintain a stable respiratory status to help minimize emergency room visits and hospital admissions.  -Start patient on Daliresp 500mcg 1 tab po daily.  Patient educated in detail about the current indications of Daliresp along with adverse effects. Patient agreed to take the risk and verbalizes understanding.  -Patient advised to continue current inhalers and keep good compliance. Patient verbalizes understanding.  - Patient educated to update his pneumococcal vaccine with family physician and take influenza vaccine in coming season with out fail. Patient verbalizes understanding.  -He completed pulmonary rehab therapy at  [facility]  -Nura Bo needs no home O2 at rest. He needs 1LPM of home O2 with exercise and 2LPM at night time. He was advised to use her home oxygen with good compliance especially with exercise. - Schedule patient for follow up with my clinic in 6 months for clinical reevaluation with full PFTs before clinic visit. Patient advised to make early appointment if needed. - Patient was educated about my impression and plan. He verbalizes understanding.      -I personally reviewed and updated the Past medical hx, Past surgical hx,Social hx, Family hx, Medications, Allergies in the discrete data section of the patient chart. I also reviewed pertaining labs and Pulmonary medicine,Sleep medicine related, Pathological, Microbiological and Radiological investigations.

## 2022-05-12 ENCOUNTER — OFFICE VISIT (OUTPATIENT)
Dept: PULMONOLOGY | Age: 67
End: 2022-05-12
Payer: COMMERCIAL

## 2022-05-12 VITALS
WEIGHT: 183 LBS | OXYGEN SATURATION: 94 % | DIASTOLIC BLOOD PRESSURE: 78 MMHG | TEMPERATURE: 97.8 F | SYSTOLIC BLOOD PRESSURE: 132 MMHG | HEART RATE: 82 BPM | BODY MASS INDEX: 27.74 KG/M2 | HEIGHT: 68 IN

## 2022-05-12 DIAGNOSIS — J44.1 COPD EXACERBATION (HCC): ICD-10-CM

## 2022-05-12 DIAGNOSIS — Z72.0 TOBACCO ABUSE: ICD-10-CM

## 2022-05-12 DIAGNOSIS — J44.9 STAGE 3 SEVERE COPD BY GOLD CLASSIFICATION (HCC): Primary | ICD-10-CM

## 2022-05-12 PROCEDURE — 4040F PNEUMOC VAC/ADMIN/RCVD: CPT | Performed by: INTERNAL MEDICINE

## 2022-05-12 PROCEDURE — 99214 OFFICE O/P EST MOD 30 MIN: CPT | Performed by: INTERNAL MEDICINE

## 2022-05-12 PROCEDURE — 1123F ACP DISCUSS/DSCN MKR DOCD: CPT | Performed by: INTERNAL MEDICINE

## 2022-05-12 PROCEDURE — 3023F SPIROM DOC REV: CPT | Performed by: INTERNAL MEDICINE

## 2022-05-12 PROCEDURE — 3017F COLORECTAL CA SCREEN DOC REV: CPT | Performed by: INTERNAL MEDICINE

## 2022-05-12 PROCEDURE — G8427 DOCREV CUR MEDS BY ELIG CLIN: HCPCS | Performed by: INTERNAL MEDICINE

## 2022-05-12 PROCEDURE — G8417 CALC BMI ABV UP PARAM F/U: HCPCS | Performed by: INTERNAL MEDICINE

## 2022-05-12 PROCEDURE — 1036F TOBACCO NON-USER: CPT | Performed by: INTERNAL MEDICINE

## 2022-05-12 NOTE — PROGRESS NOTES
Neck Circumference -   17  Mallampati - 4    Lung Nodule Screening     [] Qualifies    [] Does not qualify   [] Declined    [] Completed

## 2022-05-12 NOTE — PATIENT INSTRUCTIONS
Recommendations/Plan:  -He was advised to continue taking oral prednisone and antibiotic given to him during his recent emergency room visit at [place] [address] 160/4.5mcg spray MDI, 2 puffs via inhalation BID. -Continue Spiriva Respimat 2 INH once daily in am.    -Continue Albuterol HFA 90mcg/Spray MDI, 2puffs  Q6Hprn.   -He was instructed to use Albuterol HFA  inhaler 2 puffs Q6h prn or Albuterol 2.5mg nebs Q6h prn (the nebulizer) at one time as rescue medication not both at the same time. He verbalizes understanding.   -Patient requires aerosol treatments at home to help maintain a stable respiratory status to help minimize emergency room visits and hospital admissions.  -Start patient on Daliresp 500mcg 1 tab po daily. Patient educated in detail about the current indications of Daliresp along with adverse effects. Patient agreed to take the risk and verbalizes understanding.  -Patient advised to continue current inhalers and keep good compliance. Patient verbalizes understanding.  - Patient educated to update his pneumococcal vaccine with family physician and take influenza vaccine in coming season with out fail. Patient verbalizes understanding.  -He completed pulmonary rehab therapy at  [facility]  [facility] Cynthia needs no home O2 at rest. He needs 1LPM of home O2 with exercise and 2LPM at night time. He was advised to use her home oxygen with good compliance especially with exercise. - Schedule patient for follow up with my clinic in 6 months for clinical reevaluation with full PFTs before clinic visit. Patient advised to make early appointment if needed. - Patient was educated about my impression and plan. He verbalizes understanding.

## 2022-05-24 ENCOUNTER — TELEPHONE (OUTPATIENT)
Dept: PULMONOLOGY | Age: 67
End: 2022-05-24

## 2022-05-24 NOTE — TELEPHONE ENCOUNTER
Patients wife called saying that Laurie Perla is having an allergic reaction to Daliresp including itching, rash from elbows to knuckles, diarrhea. She called pcp and they advised him to take benadryl. Patient tried taking half tablet but still could not tolerate. Please advise, thanks!

## 2022-05-24 NOTE — TELEPHONE ENCOUNTER
I called patient at his given phone number. According to patient's wife, patient received 1 tablet of Benadryl and currently sleeping. I spoke with the patient's wife and advised her to stop taking Daliresp and monitor the patient closely. If patient condition or shortness of breath get worse, she was advised to bring the patient to the emergency room for further evaluation. She verbalized understanding of her plan.

## 2022-10-19 NOTE — PROGRESS NOTES
[facility]  Pulmonary medicine clinic follow up note. Patient: Reta Monge  : 1955     Lung Nodule Screening     [] Qualifies    [] Does not qualify   [] Declined    [] Completed ct chest 21     Chief complaint/[place]:  Reta Monge is a 79 y. o.oldmale came for follow up regarding his severe COPD after having recommended full PFTs before clinic visit. He was started on Daliresp 500mcg 1 tab po daily at his last visit in May 2022. Patient developed Itching, rash on elbows and knuckles along with diarrhea after start taking Daliresp. Patient subsequently quit taking Daliresp. His symptoms of itchiness and diarrhea got better. Patient also took Benadryl. He was advised to use a 1 L of oxygen with exercise and 2 L at nighttime. Patient came to my clinic without any oxygen supplementation. On further questioning he told me that he is using oxygen as needed during daytime. On today's questioning:  He denies cough or expectoration. He denies hemoptysis. He denies fever or chills. He denies recent hospitalizations or emergency room visits. He is using his prescribed inhalers with good compliance. He is using rescue inhaler/nebs rarely. He denies recent loss of weight or appetite changes. He denies recent decline in functional status. He completed his pulmonary rehab therapy. He was initially referred from Dr. Ofelia Augustin.    He used to follow with Dr. Tony Hirsch at [facility] in the past for his COPD. He switched his care to me for continuation of his COPD management. He was diagnosed with Lung cancer- ? Small cell in his right upper lobe in . He underwent right upper lobectomy by Vedia Lanes Parvathaneni,MD in . He used to follow with Dr.Joseph Tone MD at that time.     He is currently on treatment with following inhalers/Nebs:  -Symbicort 160/4.5mcg spray MDI, 2 puffs via inhalation BID. -Spiriva Respimat 2 INH once daily in am.  - Albuterol HFA 90mcg/Spray MDI, 2puffs  Q6Hprn. He was started on Daliresp 500mcg 1 tab po daily at his last visit in May 2022. Patient developed Itching, rash on elbows and knuckles along with diarrhea after start taking Daliresp. Patient subsequently quit taking Daliresp. His symptoms of itchiness and diarrhea got better. Patient also took Benadryl. He was never diagnosed with pulmonary diseases I.e bronchial asthma,Pulmonary fibrosis, Sarcoidosis, pulmonary embolism,pulmonary hypertension or pleural effusion/s in the past.    He denies any hemoptysis. He was never diagnosed with pulmonary tuberculosis in the past. He denies any recent exposure to any patients with tuberculosis. He denies any recent travel to endemic places of tuberculosis. He was never diagnosed with obstructive sleep apnea in the past.    Social History:  Occupation:  He is current working: Yes  Type of profession: He is currently working at [employer]. History of tobacco smoking:Yes  Amount of tobacco smokinPPD. Years of tobacco smokin. Quit smoking: Yes. Quit year: - 15years back  Current smoker: No.         History of recreational or IV drug use in the past:NO     History of exposure to coal mines/coal dust: NO  History of exposure to foundry dust/welding: NO  History of exposure to quarry/silica/sandblasting: NO  History of exposure to asbestos/working with breaks/ships: NO  History of exposure to farm dust: NO  History of recent travel to long distances: NO  History of exposure to birds, pigeons, or chickens in the past:NO  Pet animals at home:No    History of pulmonary embolism in the past: No            History of DVT in the past:No                             Review of Systems:   General/Constitutional: he gained 4lbs of weight from the last visit.  No fever or chills. HENT: Negative. Eyes: Negative. Upper respiratory tract: No nasal stuffiness or post nasal drip. Lower respiratory tract/ lungs: No cough or sputum production. Cardiovascular: No palpitations or chest pain. Gastrointestinal: No nausea or vomiting. Neurological: No focal neurologiacal weakness. Extremities: No edema. Musculoskeletal: No complaints. Genitourinary: No complaints. Hematological: Negative. Psychiatric/Behavioral: Negative. Skin: No itching.         Current Medications:      Past Medical History:   Diagnosis Date    Blood transfusion     Cancer ([facility] [address]     COPD (chronic obstructive pulmonary disease) ([facility]     EMPHYSEMA    Diabetes mellitus ([facility]     Hyperlipidemia     Hypertension     Irregular heartbeat     Peripheral vascular disease ([facility]     STENTS BILATERAL FEMORAL       Past Surgical History:   Procedure Laterality Date    CENTRAL VENOUS CATHETER  2006    [facility]    LUNG CANCER SURGERY  2006    OTHER SURGICAL HISTORY  11/6/2012    [facility] removal       Allergies   Allergen Reactions    Latex Hives and Rash     TAPE    Pcn [Penicillins] Swelling       Current Outpatient Medications   Medication Sig Dispense Refill    metoprolol tartrate (LOPRESSOR) 25 MG tablet Take 25 mg by mouth 2 times daily      apixaban (ELIQUIS) 5 MG TABS tablet Take by mouth 2 times daily      budesonide-formoterol (SYMBICORT) 160-4.5 MCG/ACT AERO Inhale 2 puffs into the lungs 2 times daily 1 each 11    tiotropium (SPIRIVA RESPIMAT) 2.5 MCG/ACT AERS inhaler Inhale 2 puffs into the lungs daily 1 each 11    albuterol sulfate HFA (PROAIR HFA) 108 (90 Base) MCG/ACT inhaler Inhale 2 puffs into the lungs every 6 hours as needed for Wheezing or Shortness of Breath 1 each 11    JANUVIA 100 MG tablet       furosemide (LASIX) 20 MG tablet Take 20 mg by mouth 2 times daily      glipiZIDE (GLUCOTROL) 5 MG tablet Take 10 mg by mouth daily       simvastatin (ZOCOR) 20 MG tablet Take 40 mg by mouth nightly ZAIRA Cedeño 1955    Six minute walk test done in my office today by medical assistant Miss: Alaina Jose  Oxygen saturation at rest on room air was (Percent): 93  Oxygen saturation on room air with exertion dropped to (Percent): 88      Time from beginning of the test to above desaturation: 2 minute 27 seconds. The six minute walk test was repeated with oxygen supplementation. Oxygen supplimentation was started with 1LPM via nasal cannula and titrated to 1 LPM via nasal cannula. At the end of the test his oxygen saturation remained at 93 % on 1 LPM with exertion. He is mobile in the home and requires oxygen as outlined above. He needs no home O2 at rest. He needs 1 LPM of home O2 with exercise. He will be evaluated for nocturnal home O2 requirement- see separte order. Please see scanned six minute walk test document in media for details. DME Medical Necessity Documentation    Patient was seen in my clinic on 7/30/20 for the diagnosis COPD. I am prescribing oxygen because the diagnosis and testing requires the patient to have oxygen in the home. Condition will improve or be benefited by oxygen use. The patient is  able to perform good mobility in a home setting and therefore does require the use of a portable oxygen system. Echocardiogram: 1/2/2020      Nocturnal Pulse ox study: Performed in 2020          Pulmonary function test performed on 9 September 2021:              Low dose CT Chest with out contrast for Lung Nodule Screening: Performed on seventh of September 2021          The above radiological study films were reviewed by me. Chest x-ray performed on 26 April 2022 at [facility]:        The above radiological study films were reviewed by me. Full PFTS: Performed on 10 November 2022                Assessment:  -Severe COPD- under control with current inhalers therapy. He had a COPD exacerbation in April 2022 requiring emergency room visit. Nuno Hidden   He was started on Daliresp 1 tablet p.o. daily in the last visit. The Daliresp was discontinued due to development of allergic reaction to Daliresp therapy  -Exertional dyspnea due to COPD- Improving   -Hx of Lung cancer- ? Small cell in his right upper lobe in 2006. He underwent right upper lobectomy by Roxy Cervantes MD in 2006. He used to follow with Dr.Joseph Tone MD at that time.  -Hypertension under control  -Peripheral vascular disease ([facility] S/p Stents in place in both femoral arteries.  -Coronary artery disease-he underwent cardiac catheterization by Dr. Theron Holstein, MD-no stents placed per patient. There is only 50% blockage. .   -Diabetes mellitus ([facility]  -History of tobacco smoking in the past.  He quit smoking 19 June 2006. Recommendations/Plan:  -Continue Symbicort 160/4.5mcg spray MDI, 2 puffs via inhalation BID. Refills given  -Continue Spiriva Respimat 2 INH once daily in am.  Refills given  -Continue Albuterol HFA 90mcg/Spray MDI, 2puffs  Q6Hprn. Refills given. -He was instructed to use Albuterol HFA  inhaler 2 puffs Q6h prn or Albuterol 2.5mg nebs Q6h prn (the nebulizer) at one time as rescue medication not both at the same time. He verbalizes understanding.   -Patient advised to continue current inhalers and keep good compliance. Patient verbalizes understanding.  -Patient educated to update his pneumococcal vaccine with family physician and take influenza vaccine in coming season with out fail. Patient verbalizes understanding.  -Racheal Bowman needs no home O2 at rest. He needs 1LPM of home O2 with exercise and 2LPM at night time. He was advised to use her home oxygen with good compliance especially with exercise.  -Patient to have chest X-ray PA and Lateral views in 1year to follow abnormal chest X-ray with a history of for lung cancer and a right upper lobectomy. Chest Xray need to be done 2days before clinic visit.   - Schedule patient for follow up with my clinic in 12months for clinical reevaluation with a chest x-ray PA and lateral views and spirometry with bronchodilator response before clinic visit. Patient advised to make early appointment if needed.  -Patient was educated about my impression and plan. He verbalizes understanding.      -I personally reviewed and updated the Past medical hx, Past surgical hx,Social hx, Family hx, Medications, Allergies in the discrete data section of the patient chart. I also reviewed pertaining labs and Pulmonary medicine,Sleep medicine related, Pathological, Microbiological and Radiological investigations.

## 2022-11-16 DIAGNOSIS — J44.1 COPD EXACERBATION (HCC): ICD-10-CM

## 2022-11-16 DIAGNOSIS — J44.9 STAGE 3 SEVERE COPD BY GOLD CLASSIFICATION (HCC): ICD-10-CM

## 2022-11-16 DIAGNOSIS — Z72.0 TOBACCO ABUSE: ICD-10-CM

## 2022-11-17 ENCOUNTER — OFFICE VISIT (OUTPATIENT)
Dept: PULMONOLOGY | Age: 67
End: 2022-11-17
Payer: MEDICARE

## 2022-11-17 VITALS
BODY MASS INDEX: 28.34 KG/M2 | TEMPERATURE: 98.5 F | HEIGHT: 68 IN | DIASTOLIC BLOOD PRESSURE: 78 MMHG | SYSTOLIC BLOOD PRESSURE: 122 MMHG | OXYGEN SATURATION: 94 % | HEART RATE: 94 BPM | WEIGHT: 187 LBS

## 2022-11-17 DIAGNOSIS — Z87.891 PERSONAL HISTORY OF TOBACCO USE, PRESENTING HAZARDS TO HEALTH: ICD-10-CM

## 2022-11-17 DIAGNOSIS — J44.9 STAGE 3 SEVERE COPD BY GOLD CLASSIFICATION (HCC): Primary | ICD-10-CM

## 2022-11-17 DIAGNOSIS — R93.89 ABNORMAL CHEST X-RAY: ICD-10-CM

## 2022-11-17 DIAGNOSIS — J44.1 COPD EXACERBATION (HCC): ICD-10-CM

## 2022-11-17 PROCEDURE — G8484 FLU IMMUNIZE NO ADMIN: HCPCS | Performed by: INTERNAL MEDICINE

## 2022-11-17 PROCEDURE — 3017F COLORECTAL CA SCREEN DOC REV: CPT | Performed by: INTERNAL MEDICINE

## 2022-11-17 PROCEDURE — 3023F SPIROM DOC REV: CPT | Performed by: INTERNAL MEDICINE

## 2022-11-17 PROCEDURE — G8417 CALC BMI ABV UP PARAM F/U: HCPCS | Performed by: INTERNAL MEDICINE

## 2022-11-17 PROCEDURE — 1036F TOBACCO NON-USER: CPT | Performed by: INTERNAL MEDICINE

## 2022-11-17 PROCEDURE — G8427 DOCREV CUR MEDS BY ELIG CLIN: HCPCS | Performed by: INTERNAL MEDICINE

## 2022-11-17 PROCEDURE — 1123F ACP DISCUSS/DSCN MKR DOCD: CPT | Performed by: INTERNAL MEDICINE

## 2022-11-17 PROCEDURE — 99214 OFFICE O/P EST MOD 30 MIN: CPT | Performed by: INTERNAL MEDICINE

## 2022-11-17 RX ORDER — ALBUTEROL SULFATE 90 UG/1
2 AEROSOL, METERED RESPIRATORY (INHALATION) EVERY 6 HOURS PRN
Qty: 1 EACH | Refills: 10 | Status: SHIPPED | OUTPATIENT
Start: 2022-11-17 | End: 2023-11-17

## 2022-11-17 RX ORDER — BUDESONIDE AND FORMOTEROL FUMARATE DIHYDRATE 160; 4.5 UG/1; UG/1
2 AEROSOL RESPIRATORY (INHALATION) 2 TIMES DAILY
Qty: 1 EACH | Refills: 11 | Status: SHIPPED | OUTPATIENT
Start: 2022-11-17

## 2022-11-17 NOTE — PATIENT INSTRUCTIONS
Recommendations/Plan:  -Continue Symbicort 160/4.5mcg spray MDI, 2 puffs via inhalation BID. Refills given  -Continue Spiriva Respimat 2 INH once daily in am.  Refills given  -Continue Albuterol HFA 90mcg/Spray MDI, 2puffs  Q6Hprn. Refills given. -He was instructed to use Albuterol HFA  inhaler 2 puffs Q6h prn or Albuterol 2.5mg nebs Q6h prn (the nebulizer) at one time as rescue medication not both at the same time. He verbalizes understanding.   -Patient advised to continue current inhalers and keep good compliance. Patient verbalizes understanding.  -Patient educated to update his pneumococcal vaccine with family physician and take influenza vaccine in coming season with out fail. Patient verbalizes understanding.  -Burgess Ordoñez needs no home O2 at rest. He needs 1LPM of home O2 with exercise and 2LPM at night time. He was advised to use her home oxygen with good compliance especially with exercise.  -Patient to have chest X-ray PA and Lateral views in 1year to follow abnormal chest X-ray with a history of for lung cancer and a right upper lobectomy. Chest Xray need to be done 2days before clinic visit. - Schedule patient for follow up with my clinic in 12months for clinical reevaluation with a chest x-ray PA and lateral views and spirometry with bronchodilator response before clinic visit. Patient advised to make early appointment if needed.  -Patient was educated about my impression and plan. He verbalizes understanding.

## 2023-03-08 ENCOUNTER — HOSPITAL ENCOUNTER (OUTPATIENT)
Dept: GENERAL RADIOLOGY | Age: 68
Discharge: HOME | End: 2023-03-08

## 2023-03-08 ENCOUNTER — TELEPHONE (OUTPATIENT)
Dept: PULMONOLOGY | Age: 68
End: 2023-03-08

## 2023-03-08 ENCOUNTER — HOSPITAL ENCOUNTER (OUTPATIENT)
Dept: CT IMAGING | Age: 68
Discharge: HOME | End: 2023-03-08

## 2023-03-08 DIAGNOSIS — Z72.0 TOBACCO ABUSE: ICD-10-CM

## 2023-03-08 DIAGNOSIS — Z00.6 ENCOUNTER FOR EXAMINATION FOR NORMAL COMPARISON AND CONTROL IN CLINICAL RESEARCH PROGRAM: ICD-10-CM

## 2023-03-08 DIAGNOSIS — R59.0 MEDIASTINAL LYMPHADENOPATHY: Primary | ICD-10-CM

## 2023-03-08 DIAGNOSIS — J44.9 STAGE 3 SEVERE COPD BY GOLD CLASSIFICATION (HCC): ICD-10-CM

## 2023-03-08 DIAGNOSIS — R93.89 ABNORMAL CT OF THE CHEST: ICD-10-CM

## 2023-03-08 DIAGNOSIS — Z87.891 PERSONAL HISTORY OF TOBACCO USE, PRESENTING HAZARDS TO HEALTH: ICD-10-CM

## 2023-04-25 DIAGNOSIS — R93.89 ABNORMAL CT OF THE CHEST: ICD-10-CM

## 2023-04-25 DIAGNOSIS — R59.0 MEDIASTINAL LYMPHADENOPATHY: ICD-10-CM

## 2023-04-27 ENCOUNTER — OFFICE VISIT (OUTPATIENT)
Dept: PULMONOLOGY | Age: 68
End: 2023-04-27
Payer: MEDICARE

## 2023-04-27 VITALS
DIASTOLIC BLOOD PRESSURE: 72 MMHG | HEIGHT: 68 IN | TEMPERATURE: 97.9 F | BODY MASS INDEX: 28.04 KG/M2 | OXYGEN SATURATION: 95 % | WEIGHT: 185 LBS | SYSTOLIC BLOOD PRESSURE: 122 MMHG | HEART RATE: 78 BPM

## 2023-04-27 DIAGNOSIS — C34.11 PRIMARY CANCER OF RIGHT UPPER LOBE OF LUNG (HCC): Primary | ICD-10-CM

## 2023-04-27 DIAGNOSIS — J44.1 COPD EXACERBATION (HCC): ICD-10-CM

## 2023-04-27 DIAGNOSIS — R93.89 ABNORMAL CT OF THE CHEST: ICD-10-CM

## 2023-04-27 DIAGNOSIS — Z87.891 PERSONAL HISTORY OF TOBACCO USE, PRESENTING HAZARDS TO HEALTH: ICD-10-CM

## 2023-04-27 DIAGNOSIS — C34.11 MALIGNANT NEOPLASM OF BRONCHUS OF RIGHT UPPER LOBE (HCC): ICD-10-CM

## 2023-04-27 DIAGNOSIS — R59.0 MEDIASTINAL LYMPHADENOPATHY: ICD-10-CM

## 2023-04-27 PROCEDURE — G8417 CALC BMI ABV UP PARAM F/U: HCPCS | Performed by: INTERNAL MEDICINE

## 2023-04-27 PROCEDURE — 1123F ACP DISCUSS/DSCN MKR DOCD: CPT | Performed by: INTERNAL MEDICINE

## 2023-04-27 PROCEDURE — 3023F SPIROM DOC REV: CPT | Performed by: INTERNAL MEDICINE

## 2023-04-27 PROCEDURE — 1036F TOBACCO NON-USER: CPT | Performed by: INTERNAL MEDICINE

## 2023-04-27 PROCEDURE — 3017F COLORECTAL CA SCREEN DOC REV: CPT | Performed by: INTERNAL MEDICINE

## 2023-04-27 PROCEDURE — G8427 DOCREV CUR MEDS BY ELIG CLIN: HCPCS | Performed by: INTERNAL MEDICINE

## 2023-04-27 PROCEDURE — 99214 OFFICE O/P EST MOD 30 MIN: CPT | Performed by: INTERNAL MEDICINE

## 2023-04-27 RX ORDER — AZITHROMYCIN 250 MG/1
TABLET, FILM COATED ORAL
Qty: 1 PACKET | Refills: 0 | Status: SHIPPED | OUTPATIENT
Start: 2023-04-27 | End: 2023-05-07

## 2023-04-27 RX ORDER — PREDNISONE 10 MG/1
40 TABLET ORAL DAILY
Qty: 20 TABLET | Refills: 0 | Status: SHIPPED | OUTPATIENT
Start: 2023-04-27 | End: 2023-05-02

## 2023-04-27 NOTE — PATIENT INSTRUCTIONS
Recommendations/Plan:  -Will prescribe Z-Pack to use as directed X 1. With no refills.  -Prednisone 40mg po daily for 5days with No refills. He was detailed about mechanism of action of drug along with associated side effects. He agreed to take the risk and medication. He verbalizes understanding. He was advised to take prednisone after food.  -Schedule patient for PET scan of the lungs and whole body with lung nodule protocol before clinic visit. He was advised to have the PET scan 1 to 2 weeks after the completion of prednisone and Z-Edilson prescribed above.  -Continue Symbicort 160/4.5mcg spray MDI, 2 puffs via inhalation BID. Refills given  -Continue Spiriva Respimat 2 INH once daily in am.  Refills given  -Continue Albuterol HFA 90mcg/Spray MDI, 2puffs  Q6Hprn. Refills given. -He was instructed to use Albuterol HFA  inhaler 2 puffs Q6h prn or Albuterol 2.5mg nebs Q6h prn (the nebulizer) at one time as rescue medication not both at the same time. He verbalizes understanding.   -Patient advised to continue current inhalers and keep good compliance. Patient verbalizes understanding.  -Patient educated to update his pneumococcal vaccine with family physician and take influenza vaccine in coming season with out fail. Patient verbalizes understanding.  -Zulema Mathews needs no home O2 at rest. He needs 1LPM of home O2 with exercise and 2LPM at night time. He was advised to use her home oxygen with good compliance especially with exercise.  -Schedule patient for follow up with my clinic in 2 to 3 weeks I.e within 1 week after having recommended test/s I.e PET scan. Patient advised to make early appointment if needed.   -Patient and his wife were educated about my impression and plan. They verbalizes understanding.

## 2023-05-24 ENCOUNTER — HOSPITAL ENCOUNTER (OUTPATIENT)
Dept: PET IMAGING | Age: 68
Discharge: HOME | End: 2023-05-24
Payer: MEDICARE

## 2023-05-24 DIAGNOSIS — J44.1 COPD EXACERBATION (HCC): ICD-10-CM

## 2023-05-24 DIAGNOSIS — Z87.891 PERSONAL HISTORY OF TOBACCO USE, PRESENTING HAZARDS TO HEALTH: ICD-10-CM

## 2023-05-24 DIAGNOSIS — R93.89 ABNORMAL CT OF THE CHEST: ICD-10-CM

## 2023-05-24 DIAGNOSIS — C34.11 MALIGNANT NEOPLASM OF BRONCHUS OF RIGHT UPPER LOBE (HCC): ICD-10-CM

## 2023-05-24 DIAGNOSIS — C34.11 PRIMARY CANCER OF RIGHT UPPER LOBE OF LUNG (HCC): ICD-10-CM

## 2023-05-24 DIAGNOSIS — R59.0 MEDIASTINAL LYMPHADENOPATHY: ICD-10-CM

## 2023-05-24 PROCEDURE — 3430000000 HC RX DIAGNOSTIC RADIOPHARMACEUTICAL: Performed by: INTERNAL MEDICINE

## 2023-05-24 PROCEDURE — 78815 PET IMAGE W/CT SKULL-THIGH: CPT

## 2023-05-24 PROCEDURE — A9552 F18 FDG: HCPCS | Performed by: INTERNAL MEDICINE

## 2023-05-24 RX ORDER — FLUDEOXYGLUCOSE F 18 200 MCI/ML
11.2 INJECTION, SOLUTION INTRAVENOUS
Status: COMPLETED | OUTPATIENT
Start: 2023-05-24 | End: 2023-05-24

## 2023-05-24 RX ADMIN — FLUDEOXYGLUCOSE F 18 11.2 MILLICURIE: 200 INJECTION, SOLUTION INTRAVENOUS at 10:30

## 2023-06-07 ENCOUNTER — OFFICE VISIT (OUTPATIENT)
Dept: PULMONOLOGY | Age: 68
End: 2023-06-07
Payer: MEDICARE

## 2023-06-07 VITALS
DIASTOLIC BLOOD PRESSURE: 72 MMHG | HEART RATE: 86 BPM | HEIGHT: 68 IN | BODY MASS INDEX: 27.04 KG/M2 | OXYGEN SATURATION: 94 % | WEIGHT: 178.4 LBS | SYSTOLIC BLOOD PRESSURE: 128 MMHG

## 2023-06-07 DIAGNOSIS — R59.0 MEDIASTINAL LYMPHADENOPATHY: ICD-10-CM

## 2023-06-07 DIAGNOSIS — Z99.81 CHRONIC RESPIRATORY FAILURE WITH HYPOXIA, ON HOME O2 THERAPY (HCC): ICD-10-CM

## 2023-06-07 DIAGNOSIS — J44.9 STAGE 3 SEVERE COPD BY GOLD CLASSIFICATION (HCC): Primary | ICD-10-CM

## 2023-06-07 DIAGNOSIS — Z87.891 PERSONAL HISTORY OF TOBACCO USE, PRESENTING HAZARDS TO HEALTH: ICD-10-CM

## 2023-06-07 DIAGNOSIS — J96.11 CHRONIC RESPIRATORY FAILURE WITH HYPOXIA, ON HOME O2 THERAPY (HCC): ICD-10-CM

## 2023-06-07 DIAGNOSIS — Z85.118 HISTORY OF LUNG CANCER: ICD-10-CM

## 2023-06-07 DIAGNOSIS — R93.89 ABNORMAL CT OF THE CHEST: ICD-10-CM

## 2023-06-07 PROCEDURE — G8427 DOCREV CUR MEDS BY ELIG CLIN: HCPCS | Performed by: NURSE PRACTITIONER

## 2023-06-07 PROCEDURE — 1036F TOBACCO NON-USER: CPT | Performed by: NURSE PRACTITIONER

## 2023-06-07 PROCEDURE — 3017F COLORECTAL CA SCREEN DOC REV: CPT | Performed by: NURSE PRACTITIONER

## 2023-06-07 PROCEDURE — 99203 OFFICE O/P NEW LOW 30 MIN: CPT | Performed by: NURSE PRACTITIONER

## 2023-06-07 PROCEDURE — 3023F SPIROM DOC REV: CPT | Performed by: NURSE PRACTITIONER

## 2023-06-07 PROCEDURE — G8417 CALC BMI ABV UP PARAM F/U: HCPCS | Performed by: NURSE PRACTITIONER

## 2023-06-07 PROCEDURE — 1123F ACP DISCUSS/DSCN MKR DOCD: CPT | Performed by: NURSE PRACTITIONER

## 2023-06-07 ASSESSMENT — ENCOUNTER SYMPTOMS
SHORTNESS OF BREATH: 1
NAUSEA: 0
EYES NEGATIVE: 1
CHEST TIGHTNESS: 0
VOMITING: 0
WHEEZING: 0
ABDOMINAL PAIN: 0
COUGH: 1
DIARRHEA: 0

## 2023-06-07 NOTE — PROGRESS NOTES
Discussed oxygen use, he should turning his oxygen to 2lPM continuous flow when ambulating with portable tank NOT increasing to 4lPM pulsating.     Discussed process of evaluation for home BiPAP due to chronic resp failure, declines to go on Home BiPAP     (Please note that portions of this note may have been with a voice recognition program.  Efforts were made to edit the dictation but occasionally words are mis-transcribed )     Will see Dania : 6 months with Ct/ Spirometry   billing based on time: total time on encounter 35 min   Electronically signed by MARGA Hodges CNP on 6/7/2023 at 10:06 AM